# Patient Record
Sex: FEMALE | Race: BLACK OR AFRICAN AMERICAN | Employment: OTHER | URBAN - METROPOLITAN AREA
[De-identification: names, ages, dates, MRNs, and addresses within clinical notes are randomized per-mention and may not be internally consistent; named-entity substitution may affect disease eponyms.]

---

## 2023-05-13 ENCOUNTER — LAB ENCOUNTER (OUTPATIENT)
Dept: LAB | Facility: HOSPITAL | Age: 73
End: 2023-05-13
Attending: INTERNAL MEDICINE
Payer: MEDICARE

## 2023-05-13 ENCOUNTER — OFFICE VISIT (OUTPATIENT)
Dept: RHEUMATOLOGY | Facility: CLINIC | Age: 73
End: 2023-05-13

## 2023-05-13 VITALS
HEIGHT: 62 IN | SYSTOLIC BLOOD PRESSURE: 114 MMHG | BODY MASS INDEX: 24.14 KG/M2 | WEIGHT: 131.19 LBS | HEART RATE: 54 BPM | DIASTOLIC BLOOD PRESSURE: 73 MMHG

## 2023-05-13 DIAGNOSIS — Z51.81 THERAPEUTIC DRUG MONITORING: ICD-10-CM

## 2023-05-13 DIAGNOSIS — M81.0 AGE-RELATED OSTEOPOROSIS WITHOUT CURRENT PATHOLOGICAL FRACTURE: Primary | ICD-10-CM

## 2023-05-13 DIAGNOSIS — M81.0 AGE-RELATED OSTEOPOROSIS WITHOUT CURRENT PATHOLOGICAL FRACTURE: ICD-10-CM

## 2023-05-13 LAB
CREAT BLD-MCNC: 1.04 MG/DL
GFR SERPLBLD BASED ON 1.73 SQ M-ARVRAT: 57 ML/MIN/1.73M2 (ref 60–?)

## 2023-05-13 PROCEDURE — 36415 COLL VENOUS BLD VENIPUNCTURE: CPT

## 2023-05-13 PROCEDURE — 82565 ASSAY OF CREATININE: CPT

## 2023-05-13 RX ORDER — LORATADINE 10 MG/1
10 TABLET ORAL
COMMUNITY
Start: 2023-02-17

## 2023-05-13 RX ORDER — TRAZODONE HYDROCHLORIDE 100 MG/1
TABLET ORAL
COMMUNITY
Start: 2023-02-17

## 2023-05-13 RX ORDER — FLUTICASONE PROPIONATE 50 MCG
2 SPRAY, SUSPENSION (ML) NASAL DAILY
COMMUNITY
Start: 2023-01-20

## 2023-05-13 RX ORDER — CALCIUM CARBONATE 300MG(750)
400 TABLET,CHEWABLE ORAL DAILY
COMMUNITY

## 2023-05-13 RX ORDER — AMLODIPINE BESYLATE 5 MG/1
5 TABLET ORAL 2 TIMES DAILY
COMMUNITY
Start: 2023-04-01

## 2023-05-13 RX ORDER — ALBUTEROL SULFATE 90 UG/1
AEROSOL, METERED RESPIRATORY (INHALATION)
COMMUNITY
Start: 2023-01-20

## 2023-05-13 RX ORDER — ATORVASTATIN CALCIUM 10 MG/1
TABLET, FILM COATED ORAL
COMMUNITY
Start: 2023-02-11

## 2023-05-13 RX ORDER — PANTOPRAZOLE SODIUM 40 MG/1
TABLET, DELAYED RELEASE ORAL
COMMUNITY
Start: 2023-04-27

## 2023-05-13 RX ORDER — MEXILETINE HYDROCHLORIDE 150 MG/1
150 CAPSULE ORAL 2 TIMES DAILY
COMMUNITY
Start: 2023-03-07

## 2023-05-13 RX ORDER — POTASSIUM CHLORIDE 750 MG/1
10 TABLET, EXTENDED RELEASE ORAL
COMMUNITY
Start: 2023-02-24

## 2023-05-13 RX ORDER — METOPROLOL SUCCINATE 50 MG/1
50 TABLET, EXTENDED RELEASE ORAL DAILY
COMMUNITY
Start: 2023-04-11

## 2023-05-13 NOTE — PROGRESS NOTES
Dear Dr. Tanvir Nixon:    I saw your patient Nancy Sanders in consultation this morning at your request, regarding osteoporosis. As you know, she is a delightful 77-year-old woman who had a DEXA scan done at [facility]. Her T-scores were -2.1 in her lumbar spine, and -2.9 in her left femoral neck. She takes 1000 units of vitamin D daily, and has for 2 years. She consumes milk and cheese. She denies that she has had fractures. She has not lost height. She underwent menopause in her early 46s, and never took Premarin. She walks some up and down stairs, but not a lot. She does not know of any family history of osteoporosis or hip fractures. She has a history of cigarette abuse, smoking 1/2 pack a day. She now states that once a week she will smoke 1 or 2 cigarettes with friends. She has a chronic cough, chronic dry mouth. She also has a history of alcohol abuse. She drank too much scotch. She still has an occasional beer. She has chronic pancreatitis. She has had malnutrition. She has acid reflux and Guerrero's esophagus. February of 2023, her creatinines were 1.01 (59) and 1.10 (53). CBC normal.  25-hydroxy vitamin D 49. TSH normal.    Past medical history:  She has allergic rhinitis. She has peripheral artery disease. She has had insomnia and a bout of depression. She has dementia. She has hypertension. She has PVCs. She is on albuterol inhaler infrequently, amlodipine, atorvastatin, vitamin D 1000 units a day, vitamin B12 daily, Flonase, loratadine, magnesium, metoprolol, mexiletine, pantoprazole, potassium chloride, trazodone. No known drug allergies. Family history:  Negative for osteoporosis or hip fractures. Social history:  She has been  twice. She lives in a senior citizen apartment. No children of her own. She drinks coffee. Review of systems:  She tosses and turns at night. Her energy is not great.   Her joints snap crackle pop in the morning they are not swollen. Occasional sweats. No fevers. No anorexia or weight loss. No rash, alopecia, internal eye inflammation. She can get nose and mouth sores. No lymphadenopathy. She can get short of breath going up stairs. She has occasional substernal chest pain. Echocardiogram and heart monitor is ordered. No acid reflux, stomach pain, nausea or vomiting, constipation or diarrhea, blood in her stools but no trouble urinating. Her eyes and mouth are both dry. No Raynaud's or headache. Physical exam:  Pleasant woman in no acute distress. Blood pressure 114/73, pulse 54, weight 131 lb 3.2 oz (59.5 kg). No rash. She uses a wig. No conjunctival injection. No cervical adenopathy. Lungs clear. S1 is 2 regular. Abdomen without hepatosplenomegaly or tenderness. Normal bowel sounds. No lower extremity edema. Neck motion is limited. Shoulders move well. Elbows, wrist, and hands are without inflammation. Spine curves are normal.  Hips move well. Knees flex and extend well without effusions. She has bunion deformities and overriding second toes. Assessment and plan:    1. Osteoporosis. She was given the up-to-date articles on osteoporosis and Reclast.    She will continue her vitamin D supplement. Her level is fine. She will continue to consume dairy products. She will try to increase her weightbearing exercise. Given her Guerrero's esophagus, she cannot take alendronate weekly on an empty stomach. Therefore, Reclast yearly x5 is a great choice for her. Creatinine will be done. If fine as expected, the order for Reclast will be produced, and it will be scheduled. Her DEXA scan will be due again after 10/21/2023, at [facility]. She will plan to follow-up with rheumatology in 1 year. 2.  Osteoarthritis. 3.  History of alcohol and cigarette abuse. Doing much better. Thank you for inviting rheumatology to participate in her care.       Sincerely,      Hitesh Medeiros MD   Rheumatology

## 2023-05-14 ENCOUNTER — TELEPHONE (OUTPATIENT)
Dept: RHEUMATOLOGY | Facility: CLINIC | Age: 73
End: 2023-05-14

## 2023-05-14 NOTE — TELEPHONE ENCOUNTER
Please let her know that her kidney function is borderline abnormal.  She can proceed with a  Reclast infusion. Please produce the therapy plan order for Reclast and I will approve. Thank you.

## 2023-05-16 PROBLEM — M81.0 AGE-RELATED OSTEOPOROSIS WITHOUT CURRENT PATHOLOGICAL FRACTURE: Status: ACTIVE | Noted: 2023-05-16

## 2023-05-16 RX ORDER — ZOLEDRONIC ACID 5 MG/100ML
5 INJECTION, SOLUTION INTRAVENOUS ONCE
OUTPATIENT
Start: 2023-05-16

## 2023-05-16 NOTE — TELEPHONE ENCOUNTER
Mailbox full. Reclast therapy plan generated and sent to provider to sign off. PPD PA Department please obtain PA.

## 2023-05-23 NOTE — TELEPHONE ENCOUNTER
Per [facility] online, no PA required for Reclast, out patient. Reference #[id number]. Message sent to infusion pools.

## 2023-05-26 NOTE — TELEPHONE ENCOUNTER
Fransisco Blair  [phone]    Called Nurse Bryan Schroeder back. Please call handestin back to give her the phone number to the Infusion Cr to scheduled her aunt/patient who has dementia.

## 2023-06-09 ENCOUNTER — OFFICE VISIT (OUTPATIENT)
Dept: HEMATOLOGY/ONCOLOGY | Facility: HOSPITAL | Age: 73
End: 2023-06-09
Payer: MEDICARE

## 2023-06-09 VITALS
SYSTOLIC BLOOD PRESSURE: 145 MMHG | TEMPERATURE: 98 F | HEART RATE: 72 BPM | RESPIRATION RATE: 16 BRPM | DIASTOLIC BLOOD PRESSURE: 61 MMHG | OXYGEN SATURATION: 98 %

## 2023-06-09 DIAGNOSIS — M81.0 AGE-RELATED OSTEOPOROSIS WITHOUT CURRENT PATHOLOGICAL FRACTURE: Primary | ICD-10-CM

## 2023-06-09 PROCEDURE — 96365 THER/PROPH/DIAG IV INF INIT: CPT

## 2023-06-09 RX ORDER — ZOLEDRONIC ACID 5 MG/100ML
5 INJECTION, SOLUTION INTRAVENOUS ONCE
Status: CANCELLED | OUTPATIENT
Start: 2023-06-09

## 2023-06-09 RX ORDER — ZOLEDRONIC ACID 5 MG/100ML
5 INJECTION, SOLUTION INTRAVENOUS ONCE
Status: COMPLETED | OUTPATIENT
Start: 2023-06-09 | End: 2023-06-09

## 2023-06-09 RX ORDER — ZOLEDRONIC ACID 5 MG/100ML
INJECTION, SOLUTION INTRAVENOUS
Status: COMPLETED
Start: 2023-06-09 | End: 2023-06-09

## 2023-06-09 RX ADMIN — ZOLEDRONIC ACID 5 MG: 5 INJECTION, SOLUTION INTRAVENOUS at 16:33:00

## 2023-06-09 NOTE — PROGRESS NOTES
Patient arrives for RECLAST infusion for osteoporosis. Denies any complaints upon arrival. Denies any planned or possible invasive dental work. Patient tolerated infusion, no s/s of adverse reaction. PIV removed, 2x2 gauze and coban applied. Discharged home, stable and aware of upcoming appointments.

## 2024-05-22 ENCOUNTER — OFFICE VISIT (OUTPATIENT)
Dept: RHEUMATOLOGY | Facility: CLINIC | Age: 74
End: 2024-05-22

## 2024-05-22 ENCOUNTER — TELEPHONE (OUTPATIENT)
Dept: RHEUMATOLOGY | Facility: CLINIC | Age: 74
End: 2024-05-22

## 2024-05-22 VITALS
BODY MASS INDEX: 22.45 KG/M2 | DIASTOLIC BLOOD PRESSURE: 64 MMHG | HEART RATE: 72 BPM | WEIGHT: 122 LBS | HEIGHT: 62 IN | SYSTOLIC BLOOD PRESSURE: 112 MMHG

## 2024-05-22 DIAGNOSIS — M81.0 AGE-RELATED OSTEOPOROSIS WITHOUT CURRENT PATHOLOGICAL FRACTURE: Primary | ICD-10-CM

## 2024-05-22 DIAGNOSIS — Z51.81 THERAPEUTIC DRUG MONITORING: ICD-10-CM

## 2024-05-22 PROCEDURE — 99215 OFFICE O/P EST HI 40 MIN: CPT | Performed by: INTERNAL MEDICINE

## 2024-05-22 NOTE — TELEPHONE ENCOUNTER
If we can get patient approved for Reclast infusion for osteoporosis.  Per patient she received her first dose in May 2023 at [place]

## 2024-05-22 NOTE — PATIENT INSTRUCTIONS
Seen today for osteoporosis  Get blood work today  Plan to get you approved for Reclast, once approved we will contact you to schedule it  Get your bone density in October 2024  Get the knee brace for your left knee  Continue calcium and vitamin D  Follow-up around this time next year as you will be getting yearly Reclast infusion

## 2024-05-22 NOTE — PROGRESS NOTES
Marsha Oliver is a 74 year old female.    HPI:     Chief Complaint   Patient presents with    Consult      former patient     Musculoskeletal Problem     Left Knee bending issues        I had the pleasure of seeing Marsha Oliver on 5/22/2024 for follow up Osteoporosis    Current Medications:  Reclast- 1st infusion 5/2023 at [facility]   Ca and Vitamin D    Interval History:  This is a 73 yo F with hx of HTN, DM, Chronic Pancreatitis, Barretts esophagus, PVCs, Dementia, history of alcohol abuse and smoking presents to establish care for osteoporosis.  She is a former patient of Dr. Wilkinson's, last seen in May 2023.  She had a bone density done at [facility] 10/21/2021 showing lumbar spine T-score -2.1, left femoral neck -2.9.  She takes calcium and vitamin D.  She received her first Reclast dose in May 2023 at [facility].  Also has been having some weakness and buckling in her left leg.  She has crepitations in her left knee.  She is doing physical therapy weekly.  She will try using a knee brace to see if that helps    History of fractures: no fractures   Family history of fractures/osteoporosis:  unknown  History of kidney stones: no  Menopause at age: early 50's, no HRT  History of steroid use: no  On anticonvulsants: no  Calcium/Vit D intake/supplementation: takes both medications  Weight bearing exercise: walks a lot   Alcohol consumption: quit 2 years ago  Tobacco use: quit about 1.5 years ago  Last dental:  no teeth uses dentures   Height loss: ~1 inch  Falls in the past 12 months: no falls   Medication for OP: Reclast x1 in May 2023       HISTORY:  No past medical history on file.   Social Hx Reviewed   Family Hx Reviewed     Medications (Active prior to today's visit):  Current Outpatient Medications   Medication Sig Dispense Refill    albuterol 108 (90 Base) MCG/ACT Inhalation Aero Soln       amLODIPine 5 MG Oral Tab Take 1 tablet (5 mg total) by mouth 2 (two) times daily.      atorvastatin 10 MG  Oral Tab       cholecalciferol 25 MCG (1000 UT) Oral Cap Take 1 capsule (1,000 Units total) by mouth daily.      cyanocobalamin 500 MCG Oral Tab Take 1 tablet (500 mcg total) by mouth daily.      fluticasone propionate 50 MCG/ACT Nasal Suspension 2 sprays by Nasal route daily.      loratadine 10 MG Oral Tab Take 1 tablet (10 mg total) by mouth daily with food.      Magnesium 400 MG Oral Tab Take 400 mg by mouth daily.      metoprolol succinate ER 50 MG Oral Tablet 24 Hr Take 1 tablet (50 mg total) by mouth daily.      mexiletine 150 MG Oral Cap Take 1 capsule (150 mg total) by mouth 2 (two) times daily.      pantoprazole 40 MG Oral Tab EC       potassium chloride 10 MEQ Oral Tab CR Take 1 tablet (10 mEq total) by mouth daily with food.      traZODone 100 MG Oral Tab       Ascorbic Acid (VITAMIN C OR) Take by mouth.      IRON OR        .cmed  Allergies:  No Known Allergies      ROS:   All other ROS are negative.     PHYSICAL EXAM:   GEN: AAOx3, NAD  HEENT: EOMI, PERRLA, no injection or icterus, oral mucosa moist, no oral lesions. No lymphadenopathy. No facial rash  CVS: RRR, no murmurs rubs or gallops. Equal 2+ distal pulses.   LUNGS: CTAB, no increased work of breathing  ABDOMEN:  soft NT/ND, +BS, no HSM  SKIN: No rashes or skin lesions. No nail findings  MSK:  No swelling or synovitis on exam  NEURO: Cranial nerves II-XII intact grossly. 5/5 strength throughout in both upper and lower extremities, sensation intact.  PSYCH: normal mood       LABS:     Reviewed    Imaging:     Bone density 10/2021:  FINDINGS:   LUMBAR SPINE:   The AVG BMD OF the L1-L4 region = 0.813 gm/cm2, T-score =  -2.1,  Z-score =   -0.6.   Findings are consistent with osteopenia.   Degenerative changes are noted in the lumbar spine.  This can artificially   elevate the calculation of bone density.     Femoral Necks:   The BMD of the left femoral neck =  0.527 gm/cm2, T-score =  -2.9,  Z-score =   -1.5.   The Findings are consistent with  osteoporosis.   The BMD of the right femoral neck =  0.625 gm/cm2, T-score =  -2.0,  Z-score =   -0.8.   The Findings are consistent with osteopenia.     ASSESSMENT/PLAN:     Osteoporosis  - Last bone density was in October 2021  - She received Reclast at [place] in May 2023  - She also takes calcium and vitamin D  - She cannot take oral bisphosphonates due to her Guerrero's esophagus  - Plan to get approved again for Reclast  - Blood work today  - Bone density ordered for October 2024    Left leg weakness  - She has some arthritis in her left knee, crepitations.  She is in physical therapy.  She will try getting a knee brace to help with stability    Spent 40 minutes obtaining history, evaluating patient, reviewing labs, discussing treatment options and completing documentation    Pt will f/u yearly    Shannan Wren MD  5/22/2024   9:52 AM

## 2024-05-25 ENCOUNTER — LAB ENCOUNTER (OUTPATIENT)
Dept: LAB | Facility: HOSPITAL | Age: 74
End: 2024-05-25
Attending: INTERNAL MEDICINE

## 2024-05-25 DIAGNOSIS — Z51.81 THERAPEUTIC DRUG MONITORING: ICD-10-CM

## 2024-05-25 DIAGNOSIS — M81.0 AGE-RELATED OSTEOPOROSIS WITHOUT CURRENT PATHOLOGICAL FRACTURE: ICD-10-CM

## 2024-05-25 LAB
ANION GAP SERPL CALC-SCNC: 6 MMOL/L (ref 0–18)
BUN BLD-MCNC: 10 MG/DL (ref 9–23)
BUN/CREAT SERPL: 11 (ref 10–20)
CALCIUM BLD-MCNC: 9.4 MG/DL (ref 8.7–10.4)
CHLORIDE SERPL-SCNC: 109 MMOL/L (ref 98–112)
CO2 SERPL-SCNC: 28 MMOL/L (ref 21–32)
CREAT BLD-MCNC: 0.91 MG/DL
EGFRCR SERPLBLD CKD-EPI 2021: 66 ML/MIN/1.73M2 (ref 60–?)
FASTING STATUS PATIENT QL REPORTED: NO
GLUCOSE BLD-MCNC: 94 MG/DL (ref 70–99)
OSMOLALITY SERPL CALC.SUM OF ELEC: 295 MOSM/KG (ref 275–295)
POTASSIUM SERPL-SCNC: 4.3 MMOL/L (ref 3.5–5.1)
SODIUM SERPL-SCNC: 143 MMOL/L (ref 136–145)
VIT D+METAB SERPL-MCNC: 40.1 NG/ML (ref 30–100)

## 2024-05-25 PROCEDURE — 80048 BASIC METABOLIC PNL TOTAL CA: CPT

## 2024-05-25 PROCEDURE — 82306 VITAMIN D 25 HYDROXY: CPT

## 2024-05-25 PROCEDURE — 36415 COLL VENOUS BLD VENIPUNCTURE: CPT

## 2024-06-03 NOTE — TELEPHONE ENCOUNTER
Patient's insurance is showing inactive. Can you please reach out and obtain new insurance. I'm attempting to get approval for patient.       Thank you,  Shannon

## 2024-06-03 NOTE — TELEPHONE ENCOUNTER
Patient's Niece called back to add insurance. Patient has [facility], which I was able to verify.     Patient's Niece also advised patient. Has [facility] which she provided at the time of the appointment and was advised we do not accept the insurance.

## 2024-06-03 NOTE — TELEPHONE ENCOUNTER
LM requesting pt call office back to provide up to date insurance information in order to process authorization for Reclast.

## 2024-06-05 DIAGNOSIS — M81.0 AGE-RELATED OSTEOPOROSIS WITHOUT CURRENT PATHOLOGICAL FRACTURE: Primary | ICD-10-CM

## 2024-06-05 NOTE — TELEPHONE ENCOUNTER
Verified coverage. Patient has a [facility] [place] plan ([facility] [id number]) which I have confirmed that we are in network with, both on the [facility] website as well as the [employer] insurance grid.      Patient's [facility] Care plan is [place] only. This means that it will only cover long term care like nursing home services and does not apply to outpatient medical benefits.      Per [facility] online, no prior authorization needed for Reclast on patient's plan. Reference # [id number]       Infusion Center: FYI, no prior authorization needed. Okay to schedule.

## 2024-06-14 ENCOUNTER — LAB ENCOUNTER (OUTPATIENT)
Dept: LAB | Facility: HOSPITAL | Age: 74
End: 2024-06-14
Attending: INTERNAL MEDICINE

## 2024-06-14 DIAGNOSIS — M81.0 AGE-RELATED OSTEOPOROSIS WITHOUT CURRENT PATHOLOGICAL FRACTURE: ICD-10-CM

## 2024-06-14 LAB — ALBUMIN SERPL-MCNC: 4.1 G/DL (ref 3.2–4.8)

## 2024-06-14 PROCEDURE — 36415 COLL VENOUS BLD VENIPUNCTURE: CPT

## 2024-06-14 PROCEDURE — 82040 ASSAY OF SERUM ALBUMIN: CPT

## 2024-06-24 ENCOUNTER — OFFICE VISIT (OUTPATIENT)
Dept: HEMATOLOGY/ONCOLOGY | Facility: HOSPITAL | Age: 74
End: 2024-06-24
Attending: INTERNAL MEDICINE

## 2024-06-24 VITALS
SYSTOLIC BLOOD PRESSURE: 127 MMHG | HEART RATE: 63 BPM | OXYGEN SATURATION: 100 % | RESPIRATION RATE: 16 BRPM | DIASTOLIC BLOOD PRESSURE: 60 MMHG | TEMPERATURE: 99 F

## 2024-06-24 DIAGNOSIS — M81.0 AGE-RELATED OSTEOPOROSIS WITHOUT CURRENT PATHOLOGICAL FRACTURE: Primary | ICD-10-CM

## 2024-06-24 PROCEDURE — 96365 THER/PROPH/DIAG IV INF INIT: CPT

## 2024-06-24 RX ORDER — ZOLEDRONIC ACID 5 MG/100ML
5 INJECTION, SOLUTION INTRAVENOUS ONCE
Status: CANCELLED | OUTPATIENT
Start: 2024-06-24

## 2024-06-24 RX ORDER — ZOLEDRONIC ACID 5 MG/100ML
5 INJECTION, SOLUTION INTRAVENOUS ONCE
Status: COMPLETED | OUTPATIENT
Start: 2024-06-24 | End: 2024-06-24

## 2024-06-24 RX ORDER — ZOLEDRONIC ACID 5 MG/100ML
INJECTION, SOLUTION INTRAVENOUS
Status: COMPLETED
Start: 2024-06-24 | End: 2024-06-24

## 2024-06-24 RX ADMIN — ZOLEDRONIC ACID 5 MG: 5 INJECTION, SOLUTION INTRAVENOUS at 16:07:00

## 2024-07-25 ENCOUNTER — TELEPHONE (OUTPATIENT)
Facility: CLINIC | Age: 74
End: 2024-07-25

## 2024-07-25 NOTE — TELEPHONE ENCOUNTER
Referral received via fax from [facility]. Referral expires on 01/12/2025. Referral is good for 6 visits. Referral in [facility] , referral bin.

## 2024-07-28 ENCOUNTER — HOSPITAL ENCOUNTER (OUTPATIENT)
Dept: CT IMAGING | Facility: HOSPITAL | Age: 74
End: 2024-07-28
Payer: MEDICARE

## 2024-07-28 ENCOUNTER — HOSPITAL ENCOUNTER (OUTPATIENT)
Dept: CT IMAGING | Facility: HOSPITAL | Age: 74
Discharge: HOME | End: 2024-07-28
Payer: MEDICARE

## 2024-07-28 DIAGNOSIS — K86.1 CHRONIC PANCREATITIS (HCC): ICD-10-CM

## 2024-07-28 LAB
CREAT BLD-MCNC: 0.9 MG/DL
EGFRCR SERPLBLD CKD-EPI 2021: 67 ML/MIN/1.73M2 (ref 60–?)

## 2024-07-28 PROCEDURE — 82565 ASSAY OF CREATININE: CPT

## 2024-07-28 PROCEDURE — 74170 CT ABD WO CNTRST FLWD CNTRST: CPT

## 2024-08-12 ENCOUNTER — HOSPITAL ENCOUNTER (OUTPATIENT)
Dept: CV DIAGNOSTICS | Facility: HOSPITAL | Age: 74
Discharge: HOME | End: 2024-08-12
Payer: MEDICARE

## 2024-08-12 DIAGNOSIS — I35.0 AORTIC STENOSIS: ICD-10-CM

## 2024-08-12 DIAGNOSIS — I50.9 CHF (CONGESTIVE HEART FAILURE) (HCC): ICD-10-CM

## 2024-08-12 PROCEDURE — 93306 TTE W/DOPPLER COMPLETE: CPT

## 2024-10-14 ENCOUNTER — HOSPITAL ENCOUNTER (OUTPATIENT)
Dept: MAMMOGRAPHY | Facility: HOSPITAL | Age: 74
Discharge: HOME | End: 2024-10-14
Payer: MEDICARE

## 2024-10-14 DIAGNOSIS — R92.8 ABNORMAL MAMMOGRAM: ICD-10-CM

## 2024-11-08 ENCOUNTER — OFFICE VISIT (OUTPATIENT)
Facility: CLINIC | Age: 74
End: 2024-11-08

## 2024-11-08 ENCOUNTER — TELEPHONE (OUTPATIENT)
Facility: CLINIC | Age: 74
End: 2024-11-08

## 2024-11-08 VITALS
WEIGHT: 123 LBS | DIASTOLIC BLOOD PRESSURE: 70 MMHG | BODY MASS INDEX: 22.63 KG/M2 | SYSTOLIC BLOOD PRESSURE: 120 MMHG | HEART RATE: 76 BPM | HEIGHT: 62 IN

## 2024-11-08 DIAGNOSIS — R93.89 ABNORMAL CT SCAN: ICD-10-CM

## 2024-11-08 DIAGNOSIS — K86.9 PANCREATIC LESION (HCC): ICD-10-CM

## 2024-11-08 DIAGNOSIS — K22.719 BARRETT'S ESOPHAGUS WITH DYSPLASIA: ICD-10-CM

## 2024-11-08 DIAGNOSIS — Z87.19 HISTORY OF CHRONIC PANCREATITIS: Primary | ICD-10-CM

## 2024-11-08 NOTE — H&P
[facility] - Gastroenterology                                                                                                  Clinic History and Physical     Chief Complaint   Patient presents with    Consult       Requesting physician or provider: Clem Flannery MD    HPI:   Marsha Oliver is a 74 year old year-old female with history of  hypertension, hyperlipidemia, dementia, chronic pancreatitis, Barrets esophagus. The patient presents to [facility].     Not currently having any pain or GI symtpoms. Transferring care from [place]. Does not think she has ever had a colonoscopy and does not want to have one.  Previous history of alcoholism, has stopped drinking since 2021. Stopped smoking 1 year ago.  Here with niece/caretaker at visit today.     GI history:   (per Dr. Oreilly note 6/2023):  Pt was first noted to have this pancreas lesion in 2/2017 on CT (4.2 x 2.6 x 3.1 cm in size) which was had an EUS and FNA back in 2/2017 which was negative for malignancy. Repeat EUS and FNA in 3/2017 which was negative for malignancy and consistent with acute pancreatitis granulation tissue.    Repeat EUS and FNA in 5/14/2021 showed with chronic pancreatitis and inadequate specimen and EGD with gastric biopsies showed GIM no dysplasia, esophagus with ratliff's no dysplasia.   She had a CT pancreas performed on 9/15/2021 which showed slightly increasing pancreas lesion (3.9 x 2.6 cm in size from 5/2021 CT AP showing lesion about 3.1 x 2.0 x 1.8 cm). EUS and FNA in 10/2021 showed chronic pancreatitis and insufficient cyto material (duodenal contamination and blood elements).    -Pancreatic EUS and FNA on 10/2021, with inconclusive pancreatic bx results as well as negative esophageal and gastric biopsies. EGD revealed Ratliff stage C1-M2 disease with otherwise normal findings, while the EUS revealed a 6mm stone in the pancreatic duct (non-obstructive, rest of duct 10mm). Repeat EGDs in 5/22 and 8/22 were reassuring for  stable esophageal disease.    CT 7/28/24:     Moderately severe chronic calcified pancreatitis    No acute pancreatitis.  No discrete pancreatic lesion    Well-circumscribed 1.7 x 1.3 centimeter mesenteric nodule anterior to the pancreatic head may represent a lymph node or a small chronic complex fluid collection     Last EGD 8/2023 at [place], records requested:  Given 1 year recall- surveillance of gastric intestinal metaplasia prior indeterminate for dysplasia and surveillance of possible barretts esophagus pre TE note     NSAIDS: none  Tobacco: former  Alcohol: former  Marijuana: none  Illicit drugs: none    FH GI malignancy:  none    No history of adverse reaction to sedation  No AMEENA  No anticoagulants/antiplatelet  No pacemaker/defibrillator  No pain medications and/or sleep aides    Wt Readings from Last 6 Encounters:   11/08/24 123 lb (55.8 kg)   05/22/24 122 lb (55.3 kg)   05/13/23 131 lb 3.2 oz (59.5 kg)          History, Medications, Allergies, ROS:      Past Medical History:    Guerrero esophagus    Essential hypertension      History reviewed. No pertinent surgical history.   Family Hx:   Family History   Problem Relation Age of Onset    Colon Cancer Neg       Social History:   Social History     Socioeconomic History    Marital status: Single   Tobacco Use    Smoking status: Former     Types: Cigarettes    Smokeless tobacco: Never    Tobacco comments:     Quit 2022   Substance and Sexual Activity    Alcohol use: Never    Drug use: Never        Medications (Active prior to today's visit):  Current Outpatient Medications   Medication Sig Dispense Refill    amLODIPine 5 MG Oral Tab Take 1 tablet (5 mg total) by mouth 2 (two) times daily.      atorvastatin 10 MG Oral Tab       cholecalciferol 25 MCG (1000 UT) Oral Cap Take 1 capsule (1,000 Units total) by mouth daily.      cyanocobalamin 500 MCG Oral Tab Take 1 tablet (500 mcg total) by mouth daily.      fluticasone propionate 50 MCG/ACT Nasal Suspension 2  sprays by Nasal route daily.      loratadine 10 MG Oral Tab Take 1 tablet (10 mg total) by mouth daily with food.      Magnesium 400 MG Oral Tab Take 400 mg by mouth daily.      metoprolol succinate ER 50 MG Oral Tablet 24 Hr Take 1 tablet (50 mg total) by mouth daily.      pantoprazole 40 MG Oral Tab EC       potassium chloride 10 MEQ Oral Tab CR Take 1 tablet (10 mEq total) by mouth daily with food.      traZODone 100 MG Oral Tab       Ascorbic Acid (VITAMIN C OR) Take by mouth.      IRON OR       albuterol 108 (90 Base) MCG/ACT Inhalation Aero Soln  (Patient not taking: Reported on 11/8/2024)      mexiletine 150 MG Oral Cap Take 1 capsule (150 mg total) by mouth 2 (two) times daily. (Patient not taking: Reported on 11/8/2024)         Allergies:  Allergies[1]    ROS:   CONSTITUTIONAL: negative for fevers, chills, sweats  EYES Negative for scleral icterus or redness, and diplopia  HEENT: Negative for hoarseness  RESPIRATORY: Negative for cough and severe shortness of breath  CARDIOVASCULAR: Negative for crushing sub-sternal chest pain  GASTROINTESTINAL: See HPI  GENITOURINARY: Negative for dysuria  MUSCULOSKELETAL: Negative for arthralgias and myalgias  SKIN: Negative for jaundice, rash or pruritus  NEUROLOGICAL: Negative for dizziness and headaches  BEHAVIOR/PSYCH: Negative for psychotic behavior      PHYSICAL EXAM:   Blood pressure 120/70, pulse 76, height 5' 2\" (1.575 m), weight 123 lb (55.8 kg).    GEN: Alert, no acute distress, well-nourished   HEENT: anicteric sclera, neck supple, trachea midline, MMM, no palpable or tender neck or supraclavicular lymph nodes  CV: +murmur, the extremities are warm and well perfused   LUNGS: No increased work of breathing, CTAB  ABDOMEN: Soft, symmetrical, non-tender without distention or guarding. No scars or lesions. Aorta is without bruit or visible pulsation. Umbilicus is midline without herniation. Normoactive bowel sounds are present, No masses, hepatomegaly or  splenomegaly noted.  MSK: No erythema, no warmth, no swelling of joints  SKIN: No jaundice, no erythema, no rashes, no lesions  HEMATOLOGIC: No bleeding, no bruising  NEURO: Alert and interactive, KO  PSYCH: appropriate mood & affect    Labs/Imaging:     Patient's labs and imaging were reviewed and discussed with patient today.    .  ASSESSMENT/PLAN:   Marsha Oliver is a 74 year old year-old female with history of  hypertension, hyperlipidemia, dementia, chronic pancreatitis, Barrets esophagus. The patient presents to establish care.     #history of Guerrero's   On Pantoprazole 40mg daily as directed. Not currently having any reflux or abdominal pain. Overdue to repeat EGD, will schedule.    #history of pancreatic lesion  #abnormal CT   #history of chronic pancreatitis   Lesion not seen on CT 7/2024. New 1.7 x 1.3 centimeter mesenteric nodule anterior to the pancreatic head. MRI ordered.     Patient Instructions   1. Schedule upper endoscopy (EGD) with [facility]oscopist [Diagnosis: History of Barretts]    Medication Changes: none     2. If you start any NEW medication after your visit today, please notify us. Certain medications will need to be held before the procedure, or the procedure cannot be performed safely.     3. DO NOT TAKE: Iron (ferrous sulfate/ ferrous gluconate) pills, herbal supplements, multivitamins, or diet medications (i.e. Phentermine/Vyvanse) for 7 days before exam.  DO NOT TAKE: Any form of alcohol, recreational drugs and any forms of Erectile Dysfunction medications 24 hours prior to procedure.    4. The day BEFORE your procedure, NOTHING TO EAT OR DRINK AFTER MIDNIGHT! If your procedure is scheduled in the afternoon, you may have clear liquids only up to 3 hours before the time of your procedure. If you fail to keep your stomach empty for 3 hours prior to procedure time, your procedure may be CANCELLED. Instructions can also be found at: [email or web address].org/[email or web address]           Endoscopy  risk/benefit discussion: I have thoroughly discussed the risks, benefits, and alternatives of endoscopic evaluation with the patient, who demonstrated understanding. This includes the potential risks of bleeding, infection, pain, anesthesia complications, and perforation, which may result in prolonged hospitalization or surgical intervention. All of the patient’s questions were addressed to their satisfaction. The patient has chosen to proceed with the endoscopic procedure, including any necessary interventions such as polypectomy, biopsy, control of bleeding.      Orders This Visit:  No orders of the defined types were placed in this encounter.      Meds This Visit:  Requested Prescriptions      No prescriptions requested or ordered in this encounter       Imaging & Referrals:  MRI ABDOMEN (W+WO) (CPT=74183)       FABIÁN Chou    [facility]  11/8/2024               [1] No Known Allergies

## 2024-11-08 NOTE — TELEPHONE ENCOUNTER
Schedulers- Patient was seen in office today, please call patient to schedule procedure per providers orders below. I reviewed and handed a copy of prep instructions with patient in office as well as medications. Patient is aware of different locations and our providers possibly booking out. No further questions asked.      Patient left office     Schedule upper endoscopy (EGD) with [facility] [Diagnosis: History of Barretts]

## 2024-11-08 NOTE — PATIENT INSTRUCTIONS
1. Schedule upper endoscopy (EGD) with [facility] [place] Endoscopist [Diagnosis: History of Barretts]    Medication Changes: none     2. If you start any NEW medication after your visit today, please notify us. Certain medications will need to be held before the procedure, or the procedure cannot be performed safely.     3. DO NOT TAKE: Iron (ferrous sulfate/ ferrous gluconate) pills, herbal supplements, multivitamins, or diet medications (i.e. Phentermine/Vyvanse) for 7 days before exam.  DO NOT TAKE: Any form of alcohol, recreational drugs and any forms of Erectile Dysfunction medications 24 hours prior to procedure.    4. The day BEFORE your procedure, NOTHING TO EAT OR DRINK AFTER MIDNIGHT! If your procedure is scheduled in the afternoon, you may have clear liquids only up to 3 hours before the time of your procedure. If you fail to keep your stomach empty for 3 hours prior to procedure time, your procedure may be CANCELLED. Instructions can also be found at: [email or web address].org/giprep

## 2024-11-12 ENCOUNTER — TELEPHONE (OUTPATIENT)
Facility: CLINIC | Age: 74
End: 2024-11-12

## 2024-11-16 NOTE — TELEPHONE ENCOUNTER
Left message to call back please transfer to GI Schedulers        Pharyngitis/ URI for adults   1. Salt water gargles 4-6 times a day to relief sore throat. ¼ tsp of salt with 4 oz of warm water.   2. May use over-the-counter Chloraseptic lozenges or spray per package instructions for relief of throat pain.  3. Inhale humidified air to promote liquification of mucus; also try over the counter Saline Nasal spray--per package directions to open nasal passages and thin secretions.    4. Increase oral fluid intake (2-3 liters a day) to promote hydration and sinus drainage.  5. Avoid alcohol, caffeine, and dairy products to avoid dehydration and worsening of mucus production.   6.  For chest congestion--try over the counter Mucinex --follow package directions.  Must drink plenty of fluids for Mucinex to be effective.   7. May take over-the-counter Tylenol or Ibuprofen/Motrin per package directions for fever/aches.   10. Frequent hand washing  11. If prescribed an antibiotic- take entire course of treatment. You should start to feel improvement in your condition after 24-48 hours.  12. If symptoms worsen- increased fever (> than 101.3 degrees), vomiting, nausea, drooling, stiffness to the neck, difficulty swallowing, chest pain, or shortness of breath- GO IMMEDIATELY TO ER OR URGENT CARE!

## 2024-12-19 ENCOUNTER — HOSPITAL ENCOUNTER (OUTPATIENT)
Dept: ULTRASOUND IMAGING | Facility: HOSPITAL | Age: 74
Discharge: HOME | End: 2024-12-19
Payer: MEDICARE

## 2024-12-19 ENCOUNTER — HOSPITAL ENCOUNTER (OUTPATIENT)
Dept: MAMMOGRAPHY | Facility: HOSPITAL | Age: 74
Discharge: HOME | End: 2024-12-19
Payer: MEDICARE

## 2024-12-19 DIAGNOSIS — R92.8 ABNORMAL MAMMOGRAM: ICD-10-CM

## 2024-12-19 PROCEDURE — 76642 ULTRASOUND BREAST LIMITED: CPT

## 2024-12-19 PROCEDURE — 77066 DX MAMMO INCL CAD BI: CPT

## 2024-12-19 PROCEDURE — 77062 BREAST TOMOSYNTHESIS BI: CPT

## 2025-01-03 ENCOUNTER — TELEPHONE (OUTPATIENT)
Facility: CLINIC | Age: 75
End: 2025-01-03

## 2025-01-03 NOTE — TELEPHONE ENCOUNTER
1st Reminder letter was sent to patient mychart for orders pending:     MRI ABDOMEN (W+WO) (CPT=74183) (Order #[id number]) on 11/10/24

## 2025-05-23 ENCOUNTER — TELEPHONE (OUTPATIENT)
Age: 75
End: 2025-05-23

## 2025-05-23 NOTE — TELEPHONE ENCOUNTER
1st No show, [facility], Dr Wren, follow up,TE created 05/23/25, letter sent via mail

## 2025-07-27 ENCOUNTER — HOSPITAL ENCOUNTER (OUTPATIENT)
Dept: MRI IMAGING | Facility: HOSPITAL | Age: 75
Discharge: HOME | End: 2025-07-27
Attending: NURSE PRACTITIONER
Payer: MEDICARE

## 2025-07-27 ENCOUNTER — HOSPITAL ENCOUNTER (OUTPATIENT)
Dept: MRI IMAGING | Facility: HOSPITAL | Age: 75
End: 2025-07-27
Attending: NURSE PRACTITIONER
Payer: MEDICARE

## 2025-07-27 DIAGNOSIS — R93.89 ABNORMAL CT SCAN: ICD-10-CM

## 2025-07-27 DIAGNOSIS — Z87.19 HISTORY OF CHRONIC PANCREATITIS: ICD-10-CM

## 2025-07-27 DIAGNOSIS — K86.9 PANCREATIC LESION (HCC): ICD-10-CM

## 2025-07-27 PROCEDURE — 76376 3D RENDER W/INTRP POSTPROCES: CPT | Performed by: NURSE PRACTITIONER

## 2025-07-27 PROCEDURE — A9575 INJ GADOTERATE MEGLUMI 0.1ML: HCPCS | Performed by: NURSE PRACTITIONER

## 2025-07-27 PROCEDURE — 74183 MRI ABD W/O CNTR FLWD CNTR: CPT | Performed by: NURSE PRACTITIONER

## 2025-07-27 RX ORDER — DIPHENHYDRAMINE HYDROCHLORIDE 50 MG/ML
10 INJECTION, SOLUTION INTRAMUSCULAR; INTRAVENOUS
Status: COMPLETED | OUTPATIENT
Start: 2025-07-27 | End: 2025-07-27

## 2025-07-27 RX ADMIN — DIPHENHYDRAMINE HYDROCHLORIDE 10 ML: 50 INJECTION, SOLUTION INTRAMUSCULAR; INTRAVENOUS at 08:46:00

## (undated) NOTE — LETTER
1/3/2025          Marsha Oliver    [address] [facility] [place] [address]         Dear Marsha,    Our records indicate that the tests ordered for you by FABIÁN Chou  have not been done.  If you have, in fact, already completed the tests or you do not wish to have the tests done, please contact our office at THE NUMBER LISTED BELOW.  Otherwise, please proceed with the testing.  Enclosed is a duplicate order for your convenience.    MRI ABDOMEN (W+WO) (CPT=74183) (Order #[id number]) on 11/10/24   To schedule a test, call Central Scheduling at [phone]    Sincerely,    FABIÁN Chou  [facility], [address] [address] [phone]